# Patient Record
Sex: FEMALE | Race: WHITE | NOT HISPANIC OR LATINO | Employment: UNEMPLOYED | URBAN - METROPOLITAN AREA
[De-identification: names, ages, dates, MRNs, and addresses within clinical notes are randomized per-mention and may not be internally consistent; named-entity substitution may affect disease eponyms.]

---

## 2017-02-02 ENCOUNTER — OFFICE VISIT (OUTPATIENT)
Dept: OBSTETRICS AND GYNECOLOGY | Facility: CLINIC | Age: 57
End: 2017-02-02
Payer: COMMERCIAL

## 2017-02-02 VITALS
HEIGHT: 69 IN | DIASTOLIC BLOOD PRESSURE: 62 MMHG | BODY MASS INDEX: 21.03 KG/M2 | WEIGHT: 142 LBS | SYSTOLIC BLOOD PRESSURE: 110 MMHG

## 2017-02-02 DIAGNOSIS — Z12.31 ENCOUNTER FOR SCREENING MAMMOGRAM FOR MALIGNANT NEOPLASM OF BREAST: ICD-10-CM

## 2017-02-02 DIAGNOSIS — Z01.419 ENCOUNTER FOR ANNUAL ROUTINE GYNECOLOGICAL EXAMINATION: Primary | ICD-10-CM

## 2017-02-02 DIAGNOSIS — Z12.4 ENCOUNTER FOR PAPANICOLAOU SMEAR FOR CERVICAL CANCER SCREENING: ICD-10-CM

## 2017-02-02 DIAGNOSIS — Z11.51 SCREENING FOR HPV (HUMAN PAPILLOMAVIRUS): ICD-10-CM

## 2017-02-02 LAB
CTP QC/QA: YES
FECAL OCCULT BLOOD, POC: NEGATIVE

## 2017-02-02 PROCEDURE — 88175 CYTOPATH C/V AUTO FLUID REDO: CPT

## 2017-02-02 PROCEDURE — 99396 PREV VISIT EST AGE 40-64: CPT | Mod: S$GLB,,, | Performed by: OBSTETRICS & GYNECOLOGY

## 2017-02-02 PROCEDURE — 87624 HPV HI-RISK TYP POOLED RSLT: CPT

## 2017-02-02 PROCEDURE — 82270 OCCULT BLOOD FECES: CPT | Mod: S$GLB,,, | Performed by: OBSTETRICS & GYNECOLOGY

## 2017-02-02 PROCEDURE — 99999 PR PBB SHADOW E&M-EST. PATIENT-LVL III: CPT | Mod: PBBFAC,,, | Performed by: OBSTETRICS & GYNECOLOGY

## 2017-02-02 RX ORDER — ATORVASTATIN CALCIUM 10 MG/1
TABLET, FILM COATED ORAL
COMMUNITY
Start: 2016-11-04 | End: 2018-03-23

## 2017-02-02 RX ORDER — NEEDLES, FILTER 19GX1 1/2"
NEEDLE, DISPOSABLE MISCELLANEOUS
Refills: 4 | COMMUNITY
Start: 2016-11-16 | End: 2018-03-23

## 2017-02-02 RX ORDER — NAPROXEN SODIUM 220 MG/1
TABLET, FILM COATED ORAL
COMMUNITY
Start: 2015-11-17 | End: 2018-03-23

## 2017-02-02 RX ORDER — CALCIUM/MAGNESIUM/ZINC 333-133-5
TABLET ORAL
COMMUNITY
Start: 2015-11-17

## 2017-02-02 RX ORDER — CYANOCOBALAMIN 1000 UG/ML
INJECTION, SOLUTION INTRAMUSCULAR; SUBCUTANEOUS
Refills: 12 | COMMUNITY
Start: 2016-11-16 | End: 2018-03-23

## 2017-02-02 RX ORDER — ESTRADIOL 0.1 MG/G
CREAM VAGINAL
Qty: 42.5 G | Refills: 4 | Status: SHIPPED | OUTPATIENT
Start: 2017-02-02 | End: 2017-09-01 | Stop reason: SDUPTHER

## 2017-02-02 NOTE — PROGRESS NOTES
"Subjective:       Patient ID: Yareli Woodruff is a 56 y.o. female.    Chief Complaint:  Well Woman (pap 2015 mammo 2015 c-scope ) and Painful [place]      History of Present Illness  - here for annual. C/o pain with penetration during intercourse. Stopped using Estrace cream after shoulder surgery; wants to restart. No other complaints.    Past Medical History   Diagnosis Date    Hyperlipidemia     Menopause        Past Surgical History   Procedure Laterality Date    Shoulder surgery Right 2016     rotator cuff, Courtney    Foot surgery      Anterior cruciate ligament repair           Current Outpatient Prescriptions:     aspirin 81 MG Chew, once a day, Disp: , Rfl:     calcium-magnesium-zinc 333-133-5 mg Tab, , Disp: , Rfl:     FLUTICASONE PROPIONATE (FLONASE NASL), once a day, Disp: , Rfl:     atorvastatin (LIPITOR) 10 MG tablet, , Disp: , Rfl:     BD INTEGRA SYRINGE 3 mL 25 gauge x 5/8" Syrg, 1 ITEM EVERY 2 WEEKS, Disp: , Rfl: 4    cyanocobalamin 1,000 mcg/mL injection, INJECT INTRAMUSCULARLY 1 ML DAILY FOR 5 DAYS, THEN WEEKLY FOR 5 WEEKS, THEN EVERY OTHER WEEK, Disp: , Rfl: 12    estradiol (ESTRACE) 0.01 % (0.1 mg/gram) vaginal cream, 0.5 grams per vagina qhs x 2 weeks then twice weekly, Disp: 42.5 g, Rfl: 4    estradiol-norethindrone (ACTIVELLA) 1-0.5 mg per tablet, Take 1 tablet by mouth once daily., Disp: 90 tablet, Rfl: 4    Review of patient's allergies indicates:  No Known Allergies    GYN & OB History  No LMP recorded. Patient is postmenopausal.   Date of Last Pap: No result found    OB History    Para Term  AB SAB TAB Ectopic Multiple Living   3 3        3      # Outcome Date GA Lbr Eduin/2nd Weight Sex Delivery Anes PTL Lv   3 Para      Vag-Spont   Y   2 Para      Vag-Spont   Y   1 Para      Vag-Spont   Y          Social History     Social History    Marital status:      Spouse name: N/A    Number of children: N/A    Years of education: " "N/A     Occupational History    Not on file.     Social History Main Topics    Smoking status: Never Smoker    Smokeless tobacco: Not on file    Alcohol use Yes    Drug use: No    Sexual activity: Yes     Partners: Male     Other Topics Concern    Not on file     Social History Narrative    No narrative on file       Family History   Problem Relation Age of Onset    Breast cancer Neg Hx     Colon cancer Neg Hx     Ovarian cancer Neg Hx        Review of Systems  Review of Systems   Respiratory: Negative for shortness of breath.    Cardiovascular: Negative for chest pain and palpitations.   Gastrointestinal: Negative for blood in stool, nausea and vomiting.   Genitourinary:        - see HPI   Skin: Negative for rash and wound.   Allergic/Immunologic: Negative for immunocompromised state.   Neurological: Negative for dizziness and syncope.   Hematological: Negative for adenopathy.   Psychiatric/Behavioral: Negative for behavioral problems.        Objective:     Vitals:    02/02/17 1311   BP: 110/62   Weight: 64.4 kg (141 lb 15.6 oz)   Height: 5' 9" (1.753 m)       Physical Exam:   Constitutional: She is oriented to person, place, and time. She appears well-developed and well-nourished.        Pulmonary/Chest: Right breast exhibits no mass, no nipple discharge, no skin change, no tenderness and no swelling. Left breast exhibits no mass, no nipple discharge, no skin change, no tenderness and no swelling. Breasts are symmetrical.        Abdominal: Soft. She exhibits no distension. There is no tenderness.     Genitourinary: Rectum normal, vagina normal and uterus normal. Rectal exam shows guaiac negative stool. Guaiac negative stool. There is no tenderness or lesion on the right labia. There is no tenderness or lesion on the left labia. Cervix is normal. Right adnexum displays no mass, no tenderness and no fullness. Left adnexum displays no mass, no tenderness and no fullness. No vaginal discharge found. " Additional cervical findings: pap smear done          Musculoskeletal: Moves all extremeties.       Neurological: She is alert and oriented to person, place, and time.     Psychiatric: She has a normal mood and affect.        Assessment/ Plan:     Orders Placed This Encounter    HPV DNA probe, amplified    Mammo Digital Screening Bilat with Tomosynthesis CAD    POCT Occult Blood Stool    Liquid-based pap smear, screening    estradiol (ESTRACE) 0.01 % (0.1 mg/gram) vaginal cream       Yareli was seen today for well woman and painful intercourse.    Diagnoses and all orders for this visit:    Encounter for annual routine gynecological examination  -     POCT Occult Blood Stool    Encounter for Papanicolaou smear for cervical cancer screening  -     Liquid-based pap smear, screening    Screening for HPV (human papillomavirus)  -     HPV DNA probe, amplified    Encounter for screening mammogram for malignant neoplasm of breast  -     Mammo Digital Screening Bilat with Tomosynthesis CAD; Future  -     Mammo Digital Screening Bilat with Tomosynthesis CAD    Other orders  -     estradiol (ESTRACE) 0.01 % (0.1 mg/gram) vaginal cream; 0.5 grams per vagina qhs x 2 weeks then twice weekly        Return in about 1 year (around 2/2/2018).

## 2017-02-10 LAB — HUMAN PAPILLOMAVIRUS (HPV): NOT DETECTED

## 2017-09-01 RX ORDER — ESTRADIOL 0.1 MG/G
CREAM VAGINAL
Qty: 90 G | Refills: 1 | Status: SHIPPED | OUTPATIENT
Start: 2017-09-01 | End: 2017-09-05 | Stop reason: SDUPTHER

## 2017-09-05 RX ORDER — ESTRADIOL 0.1 MG/G
CREAM VAGINAL
Qty: 90 G | Refills: 1 | Status: SHIPPED | OUTPATIENT
Start: 2017-09-05 | End: 2018-03-23 | Stop reason: SDUPTHER

## 2017-09-11 RX ORDER — ESTRADIOL AND NORETHINDRONE ACETATE 1; .5 MG/1; MG/1
TABLET ORAL
Qty: 84 TABLET | Refills: 1 | Status: SHIPPED | OUTPATIENT
Start: 2017-09-11 | End: 2018-02-26 | Stop reason: SDUPTHER

## 2018-02-26 RX ORDER — ESTRADIOL AND NORETHINDRONE ACETATE 1; .5 MG/1; MG/1
TABLET ORAL
Qty: 84 TABLET | Refills: 1 | Status: SHIPPED | OUTPATIENT
Start: 2018-02-26 | End: 2018-03-23 | Stop reason: SDUPTHER

## 2018-03-23 ENCOUNTER — OFFICE VISIT (OUTPATIENT)
Dept: OBSTETRICS AND GYNECOLOGY | Facility: CLINIC | Age: 58
End: 2018-03-23
Payer: COMMERCIAL

## 2018-03-23 ENCOUNTER — APPOINTMENT (OUTPATIENT)
Dept: RADIOLOGY | Facility: OTHER | Age: 58
End: 2018-03-23
Attending: OBSTETRICS & GYNECOLOGY
Payer: COMMERCIAL

## 2018-03-23 VITALS
WEIGHT: 138.25 LBS | HEIGHT: 69 IN | SYSTOLIC BLOOD PRESSURE: 132 MMHG | BODY MASS INDEX: 20.48 KG/M2 | DIASTOLIC BLOOD PRESSURE: 80 MMHG

## 2018-03-23 DIAGNOSIS — Z01.419 ENCOUNTER FOR GYNECOLOGICAL EXAMINATION: Primary | ICD-10-CM

## 2018-03-23 DIAGNOSIS — Z12.31 ENCOUNTER FOR SCREENING MAMMOGRAM FOR BREAST CANCER: ICD-10-CM

## 2018-03-23 DIAGNOSIS — Z12.11 ENCOUNTER FOR SCREENING FECAL OCCULT BLOOD TESTING: ICD-10-CM

## 2018-03-23 LAB
CTP QC/QA: YES
FECAL OCCULT BLOOD, POC: NEGATIVE

## 2018-03-23 PROCEDURE — 99999 PR PBB SHADOW E&M-EST. PATIENT-LVL III: CPT | Mod: PBBFAC,,, | Performed by: OBSTETRICS & GYNECOLOGY

## 2018-03-23 PROCEDURE — 99396 PREV VISIT EST AGE 40-64: CPT | Mod: 25,S$GLB,, | Performed by: OBSTETRICS & GYNECOLOGY

## 2018-03-23 PROCEDURE — 77063 BREAST TOMOSYNTHESIS BI: CPT | Mod: 26,,, | Performed by: RADIOLOGY

## 2018-03-23 PROCEDURE — 82270 OCCULT BLOOD FECES: CPT | Mod: S$GLB,,, | Performed by: OBSTETRICS & GYNECOLOGY

## 2018-03-23 PROCEDURE — 77067 SCR MAMMO BI INCL CAD: CPT | Mod: TC,PN

## 2018-03-23 PROCEDURE — 77067 SCR MAMMO BI INCL CAD: CPT | Mod: 26,,, | Performed by: RADIOLOGY

## 2018-03-23 RX ORDER — ESTRADIOL 0.1 MG/G
CREAM VAGINAL
Qty: 90 G | Refills: 1 | Status: SHIPPED | OUTPATIENT
Start: 2018-03-23

## 2018-03-23 RX ORDER — ESTRADIOL AND NORETHINDRONE ACETATE 1; .5 MG/1; MG/1
1 TABLET ORAL DAILY
Qty: 84 TABLET | Refills: 3 | Status: SHIPPED | OUTPATIENT
Start: 2018-03-23 | End: 2019-03-19 | Stop reason: SDUPTHER

## 2018-03-23 RX ORDER — FLUTICASONE PROPIONATE 50 MCG
SPRAY, SUSPENSION (ML) NASAL
COMMUNITY
Start: 2018-03-08

## 2018-03-23 RX ORDER — WITCH HAZEL 50 %
2000 PADS, MEDICATED (EA) TOPICAL DAILY
COMMUNITY

## 2018-03-23 NOTE — PROGRESS NOTES
Subjective:       Patient ID: Yareli Woodruff is a 58 y.o. female.    Chief Complaint:  Well Woman (Annual Exam  -- Last Pap/Hpv 17, Negative  --  MMG 17, Normal (DIS)  --  Colonosocpy , Normal )      History of Present Illness  - here for annual. Having some discomfort with penetration then feels fine. Using Estrace cream twice weekly and has not missed an HRT tablet. Using a small amount of Astroglide for lubrication.    Past Medical History:   Diagnosis Date    Hyperlipidemia     Menopause     Seasonal allergies        Past Surgical History:   Procedure Laterality Date    ANTERIOR CRUCIATE LIGAMENT REPAIR Left     CERVICAL FUSION  2018    C 5, C6, C7    COLONOSCOPY      Normal,  per pt    FOOT OSTEOTOMY Left     with screw fixation    SHOULDER SURGERY Right 2016    rotator cuff, Courtney    TUBAL LIGATION           Current Outpatient Prescriptions:     calcium-magnesium-zinc 333-133-5 mg Tab, , Disp: , Rfl:     cyanocobalamin 2000 MCG tablet, Take 2,000 mcg by mouth once daily., Disp: , Rfl:     estradiol (ESTRACE) 0.01 % (0.1 mg/gram) vaginal cream, 0.5 grams per vagina qhs x 2 weeks then twice weekly, Disp: 90 g, Rfl: 1    estradiol-norethindrone (ACTIVELLA) 1-0.5 mg per tablet, Take 1 tablet by mouth once daily., Disp: 84 tablet, Rfl: 3    fluticasone (FLONASE) 50 mcg/actuation nasal spray, , Disp: , Rfl:     multivit-min-iron-FA-lutein (CENTRUM SILVER WOMEN) 8 mg iron-400 mcg-300 mcg Tab, Take 1 tablet by mouth once daily., Disp: , Rfl:     Review of patient's allergies indicates:  No Known Allergies    GYN & OB History  No LMP recorded (lmp unknown). Patient is postmenopausal.   Date of Last Pap: 2017    OB History    Para Term  AB Living   3 3 3     3   SAB TAB Ectopic Multiple Live Births           3      # Outcome Date GA Lbr Eduin/2nd Weight Sex Delivery Anes PTL Lv   3 Term  40w0d  3.345 kg (7 lb 6 oz) F Vag-Spont EPI  BREANNA   2  "Term 1985 40w0d  4.082 kg (9 lb) F Vag-Spont EPI  BREANNA   1 Term 1983 40w0d  3.685 kg (8 lb 2 oz) M Vag-Spont EPI  BREANNA          Social History     Social History    Marital status:      Spouse name: N/A    Number of children: N/A    Years of education: N/A     Occupational History    Not on file.     Social History Main Topics    Smoking status: Never Smoker    Smokeless tobacco: Never Used    Alcohol use Yes      Comment: Social     Drug use: No    Sexual activity: Yes     Partners: Male     Birth control/ protection: Post-menopausal      Comment: :      Other Topics Concern    Not on file     Social History Narrative    No narrative on file       Family History   Problem Relation Age of Onset    Breast cancer Neg Hx     Colon cancer Neg Hx     Ovarian cancer Neg Hx     Cancer Neg Hx        Review of Systems  Review of Systems   Respiratory: Negative for shortness of breath.    Cardiovascular: Negative for chest pain and palpitations.   Gastrointestinal: Negative for blood in stool, nausea and vomiting.   Genitourinary:        - see HPI   Skin: Negative for rash and wound.   Allergic/Immunologic: Negative for immunocompromised state.   Neurological: Negative for dizziness and syncope.   Hematological: Negative for adenopathy.   Psychiatric/Behavioral: Negative for behavioral problems.        Objective:     Vitals:    03/23/18 1021   BP: 132/80   Weight: 62.7 kg (138 lb 3.7 oz)   Height: 5' 9" (1.753 m)       Physical Exam:   Constitutional: She is oriented to person, place, and time. She appears well-developed and well-nourished.        Pulmonary/Chest: Right breast exhibits no mass, no nipple discharge, no skin change, no tenderness and no swelling. Left breast exhibits no mass, no nipple discharge, no skin change, no tenderness and no swelling. Breasts are symmetrical.        Abdominal: Soft. She exhibits no distension. There is no tenderness.     Genitourinary: Rectum normal and uterus " normal. Rectal exam shows guaiac negative stool. Guaiac negative stool. There is no tenderness or lesion on the right labia. There is no tenderness or lesion on the left labia. Cervix is normal. Right adnexum displays no mass, no tenderness and no fullness. Left adnexum displays no mass, no tenderness and no fullness. Vaginal discharge found.   Genitourinary Comments: White discharge c/w Estrace cream           Musculoskeletal: Moves all extremeties.       Neurological: She is alert and oriented to person, place, and time.     Psychiatric: She has a normal mood and affect.        Assessment/ Plan:     Orders Placed This Encounter    Mammo Digital Screening Bilat with Tomosynthesis CAD    POCT occult blood stool    estradiol-norethindrone (ACTIVELLA) 1-0.5 mg per tablet    estradiol (ESTRACE) 0.01 % (0.1 mg/gram) vaginal cream       Yareli was seen today for well woman.    Diagnoses and all orders for this visit:    Encounter for gynecological examination    Encounter for screening mammogram for breast cancer  -     Mammo Digital Screening Bilat with Tomosynthesis CAD; Future    Encounter for screening fecal occult blood testing  -     POCT occult blood stool    Other orders  -     estradiol-norethindrone (ACTIVELLA) 1-0.5 mg per tablet; Take 1 tablet by mouth once daily.  -     estradiol (ESTRACE) 0.01 % (0.1 mg/gram) vaginal cream; 0.5 grams per vagina qhs x 2 weeks then twice weekly    - discussed using more lubrication and pressing down on posterior introitus then guiding penis over that area to prevent a brush burn. Verbalized understanding.    Follow-up in about 1 year (around 3/23/2019) for annual exam.

## 2018-03-23 NOTE — PROGRESS NOTES
Subjective:       Patient ID: Yareli Woodruff is a 58 y.o. female.    Chief Complaint:  Well Woman (Annual Exam  -- Last Pap/Hpv 17, Negative  --  MMG 17, Normal (DIS)  --  Colonosocpy , Normal )      History of Present Illness    Past Medical History:   Diagnosis Date    Hyperlipidemia     Menopause     Seasonal allergies        Past Surgical History:   Procedure Laterality Date    ANTERIOR CRUCIATE LIGAMENT REPAIR Left     CERVICAL FUSION  2018    C 5, C6, C7    COLONOSCOPY      Normal,  per pt    FOOT OSTEOTOMY Left     with screw fixation    SHOULDER SURGERY Right 2016    rotator cuff, Courtney    TUBAL LIGATION           Current Outpatient Prescriptions:     calcium-magnesium-zinc 333-133-5 mg Tab, , Disp: , Rfl:     cyanocobalamin 2000 MCG tablet, Take 2,000 mcg by mouth once daily., Disp: , Rfl:     estradiol (ESTRACE) 0.01 % (0.1 mg/gram) vaginal cream, 0.5 grams per vagina qhs x 2 weeks then twice weekly NOTE TO PATIENT:  ANNUAL NEEDED IN 2018, Disp: 90 g, Rfl: 1    estradiol-norethindrone (ACTIVELLA) 1-0.5 mg per tablet, TAKE 1 TABLET DAILY, Disp: 84 tablet, Rfl: 1    fluticasone (FLONASE) 50 mcg/actuation nasal spray, , Disp: , Rfl:     multivit-min-iron-FA-lutein (CENTRUM SILVER WOMEN) 8 mg iron-400 mcg-300 mcg Tab, Take 1 tablet by mouth once daily., Disp: , Rfl:     Review of patient's allergies indicates:  No Known Allergies    GYN & OB History  No LMP recorded (lmp unknown). Patient is postmenopausal.   Date of Last Pap: 2017    OB History    Para Term  AB Living   3 3 3     3   SAB TAB Ectopic Multiple Live Births           3      # Outcome Date GA Lbr Eduin/2nd Weight Sex Delivery Anes PTL Lv   3 Term  40w0d  3.345 kg (7 lb 6 oz) F Vag-Spont EPI  BREANNA   2 Term  40w0d  4.082 kg (9 lb) F Vag-Spont EPI  BREANNA   1 Term  40w0d  3.685 kg (8 lb 2 oz) M Vag-Spont EPI  BREANNA          Social History     Social History     "Marital status:      Spouse name: N/A    Number of children: N/A    Years of education: N/A     Occupational History    Not on file.     Social History Main Topics    Smoking status: Never Smoker    Smokeless tobacco: Never Used    Alcohol use Yes      Comment: Social     Drug use: No    Sexual activity: Yes     Partners: Male     Birth control/ protection: Post-menopausal      Comment: :      Other Topics Concern    Not on file     Social History Narrative    No narrative on file       Family History   Problem Relation Age of Onset    Breast cancer Neg Hx     Colon cancer Neg Hx     Ovarian cancer Neg Hx     Cancer Neg Hx        Review of Systems  Review of Systems     Objective:     Vitals:    03/23/18 1021   BP: 132/80   Weight: 62.7 kg (138 lb 3.7 oz)   Height: 5' 9" (1.753 m)       Physical Exam     Assessment/ Plan:     Orders Placed This Encounter    Mammo Digital Screening Bilat with Tomosynthesis CAD    POCT occult blood stool       Yareli was seen today for well woman.    Diagnoses and all orders for this visit:    Encounter for screening mammogram for breast cancer  -     Mammo Digital Screening Bilat with Tomosynthesis CAD; Future    Encounter for screening fecal occult blood testing  -     POCT occult blood stool    Other orders  -     estradiol-norethindrone (ACTIVELLA) 1-0.5 mg per tablet; Take 1 tablet by mouth once daily.  -     estradiol (ESTRACE) 0.01 % (0.1 mg/gram) vaginal cream; 0.5 grams per vagina qhs x 2 weeks then twice weekly        No Follow-up on file.  "

## 2019-03-21 RX ORDER — ESTRADIOL AND NORETHINDRONE ACETATE 1; .5 MG/1; MG/1
TABLET ORAL
Qty: 84 TABLET | Refills: 0 | Status: SHIPPED | OUTPATIENT
Start: 2019-03-21